# Patient Record
Sex: FEMALE | Race: WHITE
[De-identification: names, ages, dates, MRNs, and addresses within clinical notes are randomized per-mention and may not be internally consistent; named-entity substitution may affect disease eponyms.]

---

## 2017-08-12 ENCOUNTER — HEALTH MAINTENANCE LETTER (OUTPATIENT)
Age: 27
End: 2017-08-12

## 2017-10-05 ENCOUNTER — OFFICE VISIT (OUTPATIENT)
Dept: OBGYN | Facility: CLINIC | Age: 27
End: 2017-10-05
Payer: COMMERCIAL

## 2017-10-05 VITALS
HEART RATE: 60 BPM | SYSTOLIC BLOOD PRESSURE: 116 MMHG | DIASTOLIC BLOOD PRESSURE: 70 MMHG | WEIGHT: 138.1 LBS | HEIGHT: 65 IN | BODY MASS INDEX: 23.01 KG/M2

## 2017-10-05 DIAGNOSIS — R61 GENERALIZED HYPERHIDROSIS: ICD-10-CM

## 2017-10-05 DIAGNOSIS — Z30.41 ENCOUNTER FOR SURVEILLANCE OF CONTRACEPTIVE PILLS: ICD-10-CM

## 2017-10-05 DIAGNOSIS — Z01.419 ENCOUNTER FOR WELL WOMAN EXAM: Primary | ICD-10-CM

## 2017-10-05 DIAGNOSIS — Z12.4 SCREENING FOR MALIGNANT NEOPLASM OF CERVIX: ICD-10-CM

## 2017-10-05 DIAGNOSIS — Z11.3 ROUTINE SCREENING FOR STI (SEXUALLY TRANSMITTED INFECTION): ICD-10-CM

## 2017-10-05 PROCEDURE — G0145 SCR C/V CYTO,THINLAYER,RESCR: HCPCS | Performed by: OBSTETRICS & GYNECOLOGY

## 2017-10-05 PROCEDURE — 87389 HIV-1 AG W/HIV-1&-2 AB AG IA: CPT | Performed by: OBSTETRICS & GYNECOLOGY

## 2017-10-05 PROCEDURE — 87591 N.GONORRHOEAE DNA AMP PROB: CPT | Performed by: OBSTETRICS & GYNECOLOGY

## 2017-10-05 PROCEDURE — 99385 PREV VISIT NEW AGE 18-39: CPT | Performed by: OBSTETRICS & GYNECOLOGY

## 2017-10-05 PROCEDURE — 87340 HEPATITIS B SURFACE AG IA: CPT | Performed by: OBSTETRICS & GYNECOLOGY

## 2017-10-05 PROCEDURE — 36415 COLL VENOUS BLD VENIPUNCTURE: CPT | Performed by: OBSTETRICS & GYNECOLOGY

## 2017-10-05 PROCEDURE — 87491 CHLMYD TRACH DNA AMP PROBE: CPT | Performed by: OBSTETRICS & GYNECOLOGY

## 2017-10-05 RX ORDER — NORGESTIMATE AND ETHINYL ESTRADIOL 7DAYSX3 28
1 KIT ORAL DAILY
Qty: 84 TABLET | Refills: 3 | Status: SHIPPED | OUTPATIENT
Start: 2017-10-05 | End: 2018-09-06

## 2017-10-05 NOTE — MR AVS SNAPSHOT
"              After Visit Summary   10/5/2017    Daniela Singh    MRN: [id number]           Patient Information     Date Of Birth          1990        Visit Information        Provider Department      10/5/2017 2:30 PM Raysa Umanzor DO [facility]        Today's Diagnoses     Encounter for well woman exam    -  1    Encounter for surveillance of contraceptive pills        Routine screening for STI (sexually transmitted infection)        Screening for malignant neoplasm of cervix        Generalized hyperhidrosis           Follow-ups after your visit        Who to contact     If you have questions or need follow up information about today's clinic visit or your schedule please contact [facility] directly at [phone].  Normal or non-critical lab and imaging results will be communicated to you by MyChart, letter or phone within 4 business days after the clinic has received the results. If you do not hear from us within 7 days, please contact the clinic through MyChart or phone. If you have a critical or abnormal lab result, we will notify you by phone as soon as possible.  Submit refill requests through [employer] or call your pharmacy and they will forward the refill request to us. Please allow 3 business days for your refill to be completed.          Additional Information About Your Visit        MyChart Information     [employer] lets you send messages to your doctor, view your test results, renew your prescriptions, schedule appointments and more. To sign up, go to [email or web address] . Click on \"Log in\" on the left side of the screen, which will take you to the Welcome page. Then click on \"Sign up Now\" on the right side of the page.     You will be asked to enter the access code listed below, as well as some personal information. Please follow the directions to create your username and password.     Your access code is: [email or web address]  Expires: 12/23/2017  " "9:28 AM     Your access code will  in 90 days. If you need help or a new code, please call your [place] clinic or [phone].        Care EveryWhere ID     This is your Care EveryWhere ID. This could be used by other organizations to access your [place] medical records  [id number]        Your Vitals Were     Pulse Height Last Period BMI (Body Mass Index)          60 5' 5\" (1.651 m) 10/05/2017 (Exact Date) 22.98 kg/m2         Blood Pressure from Last 3 Encounters:   10/05/17 116/70   02/17/15 123/74   01/22/15 107/71    Weight from Last 3 Encounters:   10/05/17 138 lb 1.6 oz (62.6 kg)   02/17/15 131 lb 8 oz (59.6 kg)   01/22/15 124 lb (56.2 kg)              We Performed the Following     Chlamydia trachomatis PCR     Hepatitis B surface antigen     HIV Antigen Antibody Combo     Neisseria gonorrhoeae PCR     PAP imaged thin layer screen          Today's Medication Changes          These changes are accurate as of: 10/5/17  5:24 PM.  If you have any questions, ask your nurse or doctor.               Start taking these medicines.        Dose/Directions    aluminum chloride 20 % external solution   Commonly known as:  DRYSOL   Used for:  Encounter for surveillance of contraceptive pills   Started by:  Raysa Umanzor DO        Apply topically At Bedtime   Quantity:  35 mL   Refills:  1         These medicines have changed or have updated prescriptions.        Dose/Directions    norgestim-eth estrad triphasic 0.18/0.215/0.25 MG-35 MCG per tablet   Commonly known as:  ORTHO TRI-CYCLEN (28)   This may have changed:  when to take this   Used for:  Encounter for surveillance of contraceptive pills   Changed by:  Raysa Umanzor DO        Dose:  1 tablet   Take 1 tablet by mouth daily   Quantity:  84 tablet   Refills:  3            Where to get your medicines      These medications were sent to [facility] - [place], [place] - [address] AT [facility] [place]  [address] " [place], [facility] [address]     Phone:  [phone]     aluminum chloride 20 % external solution    norgestim-eth estrad triphasic 0.18/0.215/0.25 MG-35 MCG per tablet                Primary Care Provider Office Phone # Fax #    Charli Kay [phone]       [address] [facility] [address]        Equal Access to Services     JORDAN MARTINEZ : Hadii aad ku hadasho Soomaali, waaxda luqadaha, qaybta kaalmada adeegyada, waxay idiin hayaan adeeg kharash la'lalan ah. So [facility] [phone].    ATENCIÓN: Si tona espkeshia, tiene a schofield disposición servicios gratuitos de asistencia lingüística. [facility] [phone].    We comply with applicable federal civil rights laws and [place] laws. We do not discriminate on the basis of race, color, national origin, age, disability, sex, sexual orientation, or gender identity.            Thank you!     Thank you for choosing [facility]  for your care. Our goal is always to provide you with excellent care. Hearing back from our patients is one way we can continue to improve our services. Please take a few minutes to complete the written survey that you may receive in the mail after your visit with us. Thank you!             Your Updated Medication List - Protect others around you: Learn how to safely use, store and throw away your medicines at www.disposemymeds.org.          This list is accurate as of: 10/5/17  5:24 PM.  Always use your most recent med list.                   Brand Name Dispense Instructions for use Diagnosis    aluminum chloride 20 % external solution    DRYSOL    35 mL    Apply topically At Bedtime    Encounter for surveillance of contraceptive pills       norgestim-eth estrad triphasic 0.18/0.215/0.25 MG-35 MCG per tablet    ORTHO TRI-CYCLEN (28)    84 tablet    Take 1 tablet by mouth daily    Encounter for surveillance of contraceptive pills       VITAMIN C PO      Take by mouth daily

## 2017-10-05 NOTE — PROGRESS NOTES
OBGYN Annual Exam     SUBJECTIVE:                                                      Daniela is a 27 year old  female who presents for annual exam.     Patient's last menstrual period was 10/05/2017 (exact date).  Menses are regular q 28-30 days,  Mild cramping and acne.  Using oral contraceptives for contraception. Trinessa refilled. She is not currently considering pregnancy.  Besides routine health maintenance,  she would like to discuss heavy generalized sweating. States she sweats all the time and has been unable to control it with antiperspirants. She has tried a product placed posterior to her axilla (does not know the name).  GYNECOLOGIC HISTORY:    Daniela is sexually active with male partners, multiple partners.    History sexually transmitted infections:No STD history  STI testing offered?  Accepted - she would like full testing performed.  History of abnormal Pap smear: yes, states she had an abnormal pap one year ago and was told to have a repeat pap this year.     Mammogram not appropriate for this patient based on age.    Family history of breast CA: No  Family history of uterine/ovarian CA: No    Family history of colon CA: No      HISTORY:  Obstetric History       T0      L0     SAB0   TAB0   Ectopic0   Multiple0   Live Births0         Past Medical History:   Diagnosis Date     Diagnostic skin and sensitization tests (aka ALLERGENS) 1/22/15 IgE tests (pt with dermatographism) all NEGATIVE for environmental allergens    history of dermatographism.     Nonallergic rhinitis     1/22/15 IgE tests (pt with dermatographism) all NEGATIVE for environmental allergens     Past Surgical History:   Procedure Laterality Date     C LEG/ANKLE SURGERY PROC UNLISTED  09    tore tendons in left ankle     Family History   Problem Relation Age of Onset     Family History Negative No family hx of      Glaucoma No family hx of      Macular Degeneration No family hx of      Social History  "    Social History     Marital status: Single     Spouse name: N/A     Number of children: N/A     Years of education: N/A     Social History Main Topics     Smoking status: Never Smoker     Smokeless tobacco: Never Used     Alcohol use No      Comment: rare     Drug use: No     Sexual activity: Yes     Partners: Male     Birth control/ protection: Condom, Pill      Comment: desmond forte 1/26/09     Other Topics Concern     None     Social History Narrative       Current Outpatient Prescriptions:      Ascorbic Acid (VITAMIN C PO), Take by mouth daily, Disp: , Rfl:      norgestim-eth estrad triphasic (ORTHO TRI-CYCLEN, 28,) 0.18/0.215/0.25 MG-35 MCG TABS, Take 1 tablet by mouth., Disp: , Rfl:      Allergies   Allergen Reactions     Nka [No Known Allergies]        Past medical, surgical, social and family history were reviewed and updated in EPIC.    ROS:   12 point review of systems negative other than symptoms noted below.      OBJECTIVE:                                                      EXAM:  /70 (BP Location: Left arm, Patient Position: Chair, Cuff Size: Adult Regular)  Pulse 60  Ht 5' 5\" (1.651 m)  Wt 138 lb 1.6 oz (62.6 kg)  LMP 10/05/2017 (Exact Date)  BMI 22.98 kg/m2   BMI: Body mass index is 22.98 kg/(m^2).  General: Alert and oriented, no distress.  Skin: Warm and dry, no lesions, rashes or discolorations.  Neck: Neck supple. Thyroid palpbably normal in size and without nodularity.  Cardiovascular: Regular rate and rhythm, no murmurs, rubs or gallops.  Axillas: significant perspiration bilaterally  Lungs:  Clear to auscultation bilaterally, breathing is unlabored.  Breasts:  Symmetric, no skin changes.  No dominant masses bilaterally.   Lymph:  No cervical, supraclavicular, infraclavicular, axillary or inguinal lymphadenopathy palpable.   Abdomen: Soft, nontender, no hepatosplenomegaly, no rebound or guarding, no masses, no hernias.   Vulva:  No external lesions, normal female hair " "distribution, no inguinal adenopathy.    Urethra:  Midline, non-tender, well supported, no discharge  Vagina:  Well-estrogenized, no abnormal discharge, no lesions  Cervix: no lesions, no discharge, cervix very posterior and difficult to reach even with gutiérrez swab manipulation - pap smear obtained with visualization of majority of cervix  Uterus:  anteverted, smooth contour, without enlargement, mobile, and without tenderness  Ovaries:  No masses appreciated, non-tender, mobile  Rectal Exam: deferred  Musculoskeletal: extremities normal      COUNSELING:        Regular exercise       Healthy diet/nutrition       Contraception       Family planning       Osteoporosis Prevention/Bone Health       Safe sex practices/STD prevention     reports that she has never smoked. She has never used smokeless tobacco.      Estimated body mass index is 22.98 kg/(m^2) as calculated from the following:    Height as of this encounter: 5' 5\" (1.651 m).    Weight as of this encounter: 138 lb 1.6 oz (62.6 kg).         ASSESSMENT/PLAN:                                                      27 year old female with satisfactory annual exam    ICD-10-CM    1. Routine screening for STI (sexually transmitted infection) Z11.3 Hepatitis B surface antigen     Chlamydia trachomatis PCR     Neisseria gonorrhoeae PCR     HIV Antigen Antibody Combo     CANCELED: Trichomonas vaginalis DNA PCR   2. Encounter for surveillance of contraceptive pills Z30.41 norgestim-eth estrad triphasic (ORTHO TRI-CYCLEN, 28,) 0.18/0.215/0.25 MG-35 MCG per tablet        3. Screening for malignant neoplasm of cervix Z12.4 PAP imaged thin layer screen   4. Generalized hyperhidrosis R61 aluminum chloride (DRYSOL) 20 % external solution      Hyperhidrosis: Drysol (Aluminum chloride hexahydrate) 20% prescribed, recommended hydrocortisone 2.5% for irritation. Apply when skin dry. Patient to see PCP if no improvement.    Patient feels as though as has vaginal \"tears or cuts\"  -- no " current symptoms or evidence of this today (additionally, no abuse or association with clothing or products). Patient will make an appointment if this recurs.   Raysa Umanzor, DO

## 2017-10-05 NOTE — LETTER
October 20, 2017      Daniela Singh  [address] [facility]  [address]  [place] [facility] [address]    Dear MsConnerSamantha,      I am happy to inform you that your recent cervical cancer screening test (PAP smear) was normal.      Preventative screenings such as this help to ensure your health for years to come. You should repeat a pap smear in 3 years, unless otherwise directed.      You will still need to return to the clinic every year for your annual exam and other preventive tests.     Please contact the clinic at [phone] if you have further questions.       Sincerely,      Raysa Umanzor DO/h

## 2017-10-05 NOTE — LETTER
[facility]  [address], [address]  [place] [address]  [phone]        October 9, 2017    Daniela Singh  [address] [facility]  [address]  [place] [address]            Dear MsConner Singh:      The results of your recent GC Chlamydia, HIV screening and Hepatitis B labs  were NORMAL.   Results for orders placed or performed in visit on 10/05/17   Hepatitis B surface antigen   Result Value Ref Range    Hep B Surface Agn Nonreactive NR^Nonreactive   HIV Antigen Antibody Combo   Result Value Ref Range    HIV Antigen Antibody Combo Nonreactive NR^Nonreactive       Chlamydia trachomatis PCR   Result Value Ref Range    Specimen Description Cervix     Chlamydia Trachomatis PCR Negative NEG^Negative   Neisseria gonorrhoeae PCR   Result Value Ref Range    Specimen Descrip Cervix     N Gonorrhea PCR Negative NEG^Negative      If you have any further questions or problems, please contact our office.    Sincerely,      Raysa Umanzor, DO

## 2017-10-05 NOTE — NURSING NOTE
"Chief Complaint   Patient presents with     Physical     due for pap     Refill Request     OCP- Trinessa- no concerns, patient would like 3 month supply     Perspiration     would like to discuss treatment options       Initial /70 (BP Location: Left arm, Patient Position: Chair, Cuff Size: Adult Regular)  Pulse 60  Ht 5' 5\" (1.651 m)  Wt 138 lb 1.6 oz (62.6 kg)  LMP 10/05/2017 (Exact Date)  BMI 22.98 kg/m2 Estimated body mass index is 22.98 kg/(m^2) as calculated from the following:    Height as of this encounter: 5' 5\" (1.651 m).    Weight as of this encounter: 138 lb 1.6 oz (62.6 kg).  Medication Reconciliation: complete     Jolynn Box CMA      "

## 2017-10-06 LAB
C TRACH DNA SPEC QL NAA+PROBE: NEGATIVE
HBV SURFACE AG SERPL QL IA: NONREACTIVE
HIV 1+2 AB+HIV1 P24 AG SERPL QL IA: NONREACTIVE
N GONORRHOEA DNA SPEC QL NAA+PROBE: NEGATIVE
SPECIMEN SOURCE: NORMAL
SPECIMEN SOURCE: NORMAL

## 2017-10-09 LAB
COPATH REPORT: NORMAL
PAP: NORMAL

## 2017-10-18 ENCOUNTER — TELEPHONE (OUTPATIENT)
Dept: OBGYN | Facility: CLINIC | Age: 27
End: 2017-10-18

## 2017-10-18 NOTE — TELEPHONE ENCOUNTER
Screening pap from 10/5/17 = Normal cytology, 27 yr old    No prior pap results in Epic chart.  Per visit notes:  History of abnormal Pap smear: yes, states she had an abnormal pap one year ago and was told to have a repeat pap this year.     Dr. Umanzor- Please advise for follow up due to abnormal pap one year ago with no available result.  When would you like repeat pap?    Thank you  Lynette Nissen RN

## 2017-10-19 NOTE — TELEPHONE ENCOUNTER
Noted.  Pap result letter sent to patient with recommendation for 3 year screening.  Lynette Nissen RN

## 2017-10-19 NOTE — TELEPHONE ENCOUNTER
Addy Beltrán,  Will a negative pap at age 27, lets repeat pap in 3 years - so in 2020.    Thank you,  Dr. Umanzor

## 2018-09-06 DIAGNOSIS — Z30.41 ENCOUNTER FOR SURVEILLANCE OF CONTRACEPTIVE PILLS: ICD-10-CM

## 2018-09-06 RX ORDER — NORGESTIMATE AND ETHINYL ESTRADIOL 7DAYSX3 28
1 KIT ORAL DAILY
Qty: 84 TABLET | Refills: 0 | Status: SHIPPED | OUTPATIENT
Start: 2018-09-06 | End: 2018-12-03

## 2018-10-10 ENCOUNTER — OFFICE VISIT (OUTPATIENT)
Dept: OBGYN | Facility: CLINIC | Age: 28
End: 2018-10-10
Payer: COMMERCIAL

## 2018-10-10 VITALS
WEIGHT: 137 LBS | DIASTOLIC BLOOD PRESSURE: 60 MMHG | SYSTOLIC BLOOD PRESSURE: 104 MMHG | HEIGHT: 65 IN | BODY MASS INDEX: 22.82 KG/M2

## 2018-10-10 DIAGNOSIS — Z30.09 ENCOUNTER FOR OTHER GENERAL COUNSELING OR ADVICE ON CONTRACEPTION: ICD-10-CM

## 2018-10-10 DIAGNOSIS — Z00.00 WELLNESS EXAMINATION: Primary | ICD-10-CM

## 2018-10-10 PROCEDURE — 99395 PREV VISIT EST AGE 18-39: CPT | Performed by: OBSTETRICS & GYNECOLOGY

## 2018-10-10 PROCEDURE — 87591 N.GONORRHOEAE DNA AMP PROB: CPT | Performed by: OBSTETRICS & GYNECOLOGY

## 2018-10-10 PROCEDURE — 87491 CHLMYD TRACH DNA AMP PROBE: CPT | Performed by: OBSTETRICS & GYNECOLOGY

## 2018-10-10 NOTE — PROGRESS NOTES
Daniela is a 28 year old  female who presents for annual exam.     Besides routine health maintenance,  she would like to discuss desire for IUD.    HPI:    GYNECOLOGIC HISTORY:    Patient's last menstrual period was 10/04/2018 (exact date).  Her current contraception method is: oral contraceptives - Trinessa.  She  reports that she has never smoked. She has never used smokeless tobacco.    Patient is sexually active.  STD testing offered? Accepted  Last PHQ-9 score on record =   PHQ-9 SCORE 2015   Total Score 2     Last GAD7 score on record =   LINDSEY-7 SCORE 2015   Total Score 1     Alcohol Score = 0    HEALTH MAINTENANCE:  Cholesterol: Never  Last Mammo: Never  Pap:  Neg  Lab Results   Component Value Date    PAP NIL 10/05/2017    PAP NIL 2009   Colonoscopy: Never  Dexa:  Never    Health maintenance updated:  yes    HISTORY:  Obstetric History       T0      L0     SAB0   TAB0   Ectopic0   Multiple0   Live Births0           Patient Active Problem List   Diagnosis     Ankle pain     Other postprocedural status(V45.89)     CARDIOVASCULAR SCREENING; LDL GOAL LESS THAN 160     Nonallergic rhinitis     Diagnostic skin and sensitization tests (aka ALLERGENS)     Past Surgical History:   Procedure Laterality Date     C LEG/ANKLE SURGERY PROC UNLISTED  09    tore tendons in left ankle      Social History   Substance Use Topics     Smoking status: Never Smoker     Smokeless tobacco: Never Used     Alcohol use 0.0 oz/week     0 Standard drinks or equivalent per week      Comment: rare         Negative family history of: Family History Negative, Glaucoma, Macular Degeneration            Current Outpatient Prescriptions   Medication Sig     Ascorbic Acid (VITAMIN C PO) Take by mouth daily     norgestim-eth estrad triphasic (ORTHO TRI-CYCLEN, 28,) 0.18/0.215/0.25 MG-35 MCG per tablet Take 1 tablet by mouth daily     No current facility-administered medications for this visit.   "    Allergies   Allergen Reactions     Nka [No Known Allergies]        Past medical, surgical, social and family histories were reviewed and updated in EPIC.    ROS:   12 point review of systems negative other than symptoms noted below.    EXAM:  /60 (BP Location: Right arm, Patient Position: Chair, Cuff Size: Adult Regular)  Ht 5' 5\" (1.651 m)  Wt 137 lb (62.1 kg)  LMP 10/04/2018 (Exact Date)  BMI 22.8 kg/m2   BMI: Body mass index is 22.8 kg/(m^2).    PHYSICAL EXAM:  Constitutional:  Appearance: Well nourished, well developed, alert, in no acute distress  Neck:  Lymph Nodes:  No lymphadenopathy present    Thyroid:  Gland size normal, nontender, no nodules or masses present  on palpation  Chest:  Respiratory Effort:  Breathing unlabored  Cardiovascular:    Heart: Auscultation:  Regular rate, normal rhythm, no murmurs present  Breasts: Inspection of Breasts:  No lymphadenopathy present., Palpation of Breasts and Axillae:  No masses present on palpation, no breast tenderness., Axillary Lymph Nodes:  No lymphadenopathy present. and No nodularity, asymmetry or nipple discharge bilaterally.  Gastrointestinal:   Abdominal Examination:  Abdomen nontender to palpation, tone normal without rigidity or guarding, no masses present, umbilicus without lesions   Liver and Spleen:  No hepatomegaly present, liver nontender to palpation    Hernias:  No hernias present  Lymphatic: Lymph Nodes:  No other lymphadenopathy present  Skin:  General Inspection:  No rashes present, no lesions present, no areas of  discoloration    Genitalia and Groin:  No rashes present, no lesions present, no areas of  discoloration, no masses present  Neurologic/Psychiatric:    Mental Status:  Oriented X3     Pelvic Exam:  External Genitalia:     Normal appearance for age, no discharge present, no tenderness present, no inflammatory lesions present, color normal  Vagina:     Normal vaginal vault without central or paravaginal defects, no " discharge present, no inflammatory lesions present, no masses present  Bladder:     Nontender to palpation  Urethra:   Urethral Body:  Urethra palpation normal, urethra structural support normal   Urethral Meatus:  No erythema or lesions present  Cervix:     Appearance healthy, no lesions present, nontender to palpation, no bleeding present  Uterus:     Uterus: firm, normal sized and nontender  Adnexa:     No adnexal tenderness present, no adnexal masses present  Perineum:     Perineum within normal limits, no evidence of trauma, no rashes or skin lesions present  Anus:     Anus within normal limits, no hemorrhoids present  Inguinal Lymph Nodes:     No lymphadenopathy present  Pubic Hair:     Normal pubic hair distribution for age  Genitalia and Groin:     No rashes present, no lesions present, no areas of discoloration, no masses present      COUNSELING:   Reviewed preventive health counseling, as reflected in patient instructions       Contraception       Family planning       Safe sex practices/STD prevention    BMI: Body mass index is 22.8 kg/(m^2).      ASSESSMENT:  28 year old female with satisfactory annual exam.    ICD-10-CM    1. Wellness examination Z00.00 NEISSERIA GONORRHOEA PCR     CHLAMYDIA TRACHOMATIS PCR   2. Encounter for other general counseling or advice on contraception Z30.09        PLAN:  Discussed all forms of Long Acting Reversible Contraceptive (LARC). She desires Mirena IUD. She has 4 friends that have it and like their IUD. Risks and benefits for placement.   Discussed timing in her cycle to schedule.       Raysa Umanzor, DO  OB/GYN

## 2018-10-10 NOTE — NURSING NOTE
"Chief Complaint   Patient presents with     Physical       Initial /60 (BP Location: Right arm, Patient Position: Chair, Cuff Size: Adult Regular)  Ht 5' 5\" (1.651 m)  Wt 137 lb (62.1 kg)  LMP 10/04/2018 (Exact Date)  BMI 22.8 kg/m2 Estimated body mass index is 22.8 kg/(m^2) as calculated from the following:    Height as of this encounter: 5' 5\" (1.651 m).    Weight as of this encounter: 137 lb (62.1 kg).  BP completed using cuff size: regular        The following HM Due: NONE        Gosia Porter CMA    "

## 2018-10-10 NOTE — MR AVS SNAPSHOT
After Visit Summary   10/10/2018    Daniela Singh    MRN: [id number]           Patient Information     Date Of Birth          1990        Visit Information        Provider Department      10/10/2018 3:00 PM Raysa Umanzor DO [facility]        Today's [facility]     Wellness examination    -  1    Encounter for other general counseling or advice on contraception          Care Instructions    Preventive Health Recommendations  Female Ages 26 - 39  Yearly exam:    See your health care provider every year in order to    Review health changes.      Discuss preventive care.       Review your medicines if you your doctor has prescribed any.    Until age 30: Get a Pap test every three years (more often if you have had an abnormal result).    After age 30: Talk to your doctor about whether you should have a Pap test every 3 years or have a Pap test with HPV screening every 5 years.    You do not need a Pap test if your uterus was removed (hysterectomy) and you have not had cancer.  You should be tested each year for STDs (sexually transmitted diseases), if you're at risk.   Talk to your provider about how often to have your cholesterol checked.  If you are at risk for diabetes, you should have a diabetes test (fasting glucose).  Shots: Get a flu shot each year. Get a tetanus shot every 10 years.    Nutrition:      Eat at least 5 servings of fruits and vegetables each day.    Eat whole-grain bread, whole-wheat pasta and brown rice instead of white grains and rice.    Consume 1000mg of Calcium and 400 IU of Vitamin D daily. If these are not in your regular diet you should take a supplement.    To calculate the calcium in your food add a zero to the percent found on the packaging (ex: 30% = 300mg of calcium per serving)    Lifestyle    Get in at least 150 minutes of physical activity a week (30 minutes a day, 5 days of the week), of which about half should be vigorous exercise  "(jogging, swimming, lifting weights).  This will help you control your weight and prevent disease.    Limit alcohol to one drink per day.    No smoking.      Wear sunscreen to prevent skin cancer.    See your dentist every six months for an exam and cleaning          Follow-ups after your visit        Follow-up notes from your care team     Return in about 2 weeks (around 10/24/2018).      Who to contact     If you have questions or need follow up information about today's clinic visit or your schedule please contact [facility] directly at [phone].  Normal or non-critical lab and imaging results will be communicated to you by [employer], letter or phone within 4 business days after the clinic has received the results. If you do not hear from us within 7 days, please contact the clinic through [employer] or phone. If you have a critical or abnormal lab result, we will notify you by phone as soon as possible.  Submit refill requests through [employer] or call your pharmacy and they will forward the refill request to us. Please allow 3 business days for your refill to be completed.          Additional Information About Your Visit        MyChart Information     [employer] lets you send messages to your doctor, view your test results, renew your prescriptions, schedule appointments and more. To sign up, go to [email or web address] . Click on \"Log in\" on the left side of the screen, which will take you to the Welcome page. Then click on \"Sign up Now\" on the right side of the page.     You will be asked to enter the access code listed below, as well as some personal information. Please follow the directions to create your username and password.     Your access code is: [email or web address]  Expires: 2019  3:45 PM     Your access code will  in 90 days. If you need help or a new code, please call your [facility] or [phone].        Care EveryWhere ID     This is your Care EveryWhere ID. This " "could be used by other organizations to access your [facility] medical records  [id number]        Your Vitals Were     Height Last Period BMI (Body Mass Index)             5' 5\" (1.651 m) 10/04/2018 (Exact Date) 22.8 kg/m2          Blood Pressure from Last 3 Encounters:   10/10/18 104/60   10/05/17 116/70   02/17/15 123/74    Weight from Last 3 Encounters:   10/10/18 137 lb (62.1 kg)   10/05/17 138 lb 1.6 oz (62.6 kg)   02/17/15 131 lb 8 oz (59.6 kg)              We Performed the Following     CHLAMYDIA TRACHOMATIS PCR     NEISSERIA GONORRHOEA PCR          Today's Medication Changes          These changes are accurate as of 10/10/18  3:45 PM.  If you have any questions, ask your nurse or doctor.               Stop taking these medicines if you haven't already. Please contact your care team if you have questions.     aluminum chloride 20 % external solution   Commonly known as:  DRYSOL   Stopped by:  Raysa Umanzor,                     Primary Care Provider Office Phone # Fax #    Charli Kay [phone]       [address] [facility] [address]        Equal Access to Services     SUMA MARTINEZ AH: Hadii delfina cardenas hadbereo Soroxieali, waaxda luqadaha, qaybta kaalmada adekatieyada, sarina castellanos. So [facility] [phone].    ATENCIÓN: Si habla español, tiene a schofield disposición servicios gratuitos de asistencia lingüística. Llame al [phone].    We comply with applicable federal civil rights laws and [place] laws. We do not discriminate on the basis of race, color, national origin, age, disability, sex, sexual orientation, or gender identity.            Thank you!     Thank you for choosing [facility]  for your care. Our goal is always to provide you with excellent care. Hearing back from our patients is one way we can continue to improve our services. Please take a few minutes to complete the written survey that you may receive in the mail after " your visit with us. Thank you!             Your Updated Medication List - Protect others around you: Learn how to safely use, store and throw away your medicines at www.disposemymeds.org.          This list is accurate as of 10/10/18  3:45 PM.  Always use your most recent med list.                   Brand Name Dispense Instructions for use Diagnosis    norgestim-eth estrad triphasic 0.18/0.215/0.25 MG-35 MCG per tablet    ORTHO TRI-CYCLEN (28)    84 tablet    Take 1 tablet by mouth daily    Encounter for surveillance of contraceptive pills       VITAMIN C PO      Take by mouth daily

## 2018-10-11 DIAGNOSIS — Z30.09 ENCOUNTER FOR OTHER GENERAL COUNSELING OR ADVICE ON CONTRACEPTION: Primary | ICD-10-CM

## 2018-10-11 RX ORDER — MISOPROSTOL 100 UG/1
400 TABLET ORAL ONCE
Qty: 4 TABLET | Refills: 0 | Status: SHIPPED | OUTPATIENT
Start: 2018-10-11 | End: 2018-10-11

## 2018-10-12 LAB
C TRACH DNA SPEC QL NAA+PROBE: NEGATIVE
N GONORRHOEA DNA SPEC QL NAA+PROBE: NEGATIVE
SPECIMEN SOURCE: NORMAL
SPECIMEN SOURCE: NORMAL

## 2018-11-05 ENCOUNTER — OFFICE VISIT (OUTPATIENT)
Dept: OBGYN | Facility: CLINIC | Age: 28
End: 2018-11-05
Payer: COMMERCIAL

## 2018-11-05 VITALS
HEIGHT: 65 IN | BODY MASS INDEX: 23.82 KG/M2 | WEIGHT: 143 LBS | SYSTOLIC BLOOD PRESSURE: 92 MMHG | DIASTOLIC BLOOD PRESSURE: 60 MMHG

## 2018-11-05 DIAGNOSIS — Z01.812 PRE-PROCEDURE LAB EXAM: Primary | ICD-10-CM

## 2018-11-05 DIAGNOSIS — Z30.430 ENCOUNTER FOR IUD INSERTION: ICD-10-CM

## 2018-11-05 DIAGNOSIS — Z30.430 ENCOUNTER FOR INSERTION OF MIRENA IUD: ICD-10-CM

## 2018-11-05 LAB — HCG, QUAL URINE: NORMAL

## 2018-11-05 PROCEDURE — 58300 INSERT INTRAUTERINE DEVICE: CPT | Performed by: OBSTETRICS & GYNECOLOGY

## 2018-11-05 PROCEDURE — 84703 CHORIONIC GONADOTROPIN ASSAY: CPT | Performed by: OBSTETRICS & GYNECOLOGY

## 2018-11-05 NOTE — MR AVS SNAPSHOT
After Visit Summary   11/5/2018    Daniela Singh    MRN: [id number]           Patient Information     Date Of Birth          1990        Visit Information        Provider Department      11/5/2018 2:15 PM Raysa Umanzor DO [facility]        Today's Diagnoses     Pre-procedure lab exam    -  1    Encounter for IUD insertion        Encounter for insertion of mirena IUD           Follow-ups after your visit        Follow-up notes from your care team     Return in about 4 weeks (around 12/3/2018) for String Check.      Your next 10 appointments already scheduled     Dec 03, 2018  2:45 PM CST   SHORT with Raysa Umanzor DO   [facility] ([facility])    [address]  [address]  [facility] [address] [phone]              Who to contact     If you have questions or need follow up information about today's clinic visit or your schedule please contact [facility] directly at [phone].  Normal or non-critical lab and imaging results will be communicated to you by [employer]hart, letter or phone within 4 business days after the clinic has received the results. If you do not hear from us within 7 days, please contact the clinic through [employer] or phone. If you have a critical or abnormal lab result, we will notify you by phone as soon as possible.  Submit refill requests through [employer]ceive or call your pharmacy and they will forward the refill request to us. Please allow 3 business days for your refill to be completed.          Additional Information About Your Visit        MyChart Information     Xceive gives you secure access to your electronic health record. If you see a primary care provider, you can also send messages to your care team and make appointments. If you have questions, please call your primary care clinic.  If you do not have a primary care provider, please call [phone] and they will assist you.       "  Care EveryWhere ID     This is your Care EveryWhere ID. This could be used by other organizations to access your [place] medical records  [id number]        Your Vitals Were     Height Last Period BMI (Body Mass Index)             5' 5\" (1.651 m) 11/01/2018 (Exact Date) 23.8 kg/m2          Blood Pressure from Last 3 Encounters:   11/05/18 92/60   10/10/18 104/60   10/05/17 116/70    Weight from Last 3 Encounters:   11/05/18 143 lb (64.9 kg)   10/10/18 137 lb (62.1 kg)   10/05/17 138 lb 1.6 oz (62.6 kg)              We Performed the Following     HC LEVONORGESTREL IU 52MG 5 YR     HCL HCG, URINE, NURSE BACKOFFICE     INSERTION INTRAUTERINE DEVICE          Today's Medication Changes          These changes are accurate as of 11/5/18  4:51 PM.  If you have any questions, ask your nurse or doctor.               Start taking these medicines.        Dose/Directions    levonorgestrel 20 MCG/24HR IUD   Commonly known as:  MIRENA (52 MG)   Used for:  Encounter for IUD insertion   Started by:  Raysa Umanzor,         Dose:  1 each   1 each (20 mcg) by Intrauterine route once for 1 dose   Quantity:  1 each   Refills:  0            Where to get your medicines      Some of these will need a paper prescription and others can be bought over the counter.  Ask your nurse if you have questions.     You don't need a prescription for these medications     levonorgestrel 20 MCG/24HR IUD                Primary Care Provider Office Phone # Fax #    Charli Kay [phone]       [facility] [address] [facility] [id number]        Equal Access to Services     [facility] AH: Hadii delfina Portillo, watracyda lustephon, qaybta kaalsarina foreman. So [facility] [phone].    ATENCIÓN: Si habla español, tiene a schofield disposición servicios gratuitos de asistencia lingüística. Llame al [phone].    We comply with applicable federal civil rights laws and [place] laws. We do " not discriminate on the basis of race, color, national origin, age, disability, sex, sexual orientation, or gender identity.            Thank you!     Thank you for choosing [facility]  for your care. Our goal is always to provide you with excellent care. Hearing back from our patients is one way we can continue to improve our services. Please take a few minutes to complete the written survey that you may receive in the mail after your visit with us. Thank you!             Your Updated Medication List - Protect others around you: Learn how to safely use, store and throw away your medicines at www.disposemymeds.org.          This list is accurate as of 11/5/18  4:51 PM.  Always use your most recent med list.                   Brand Name Dispense Instructions for use Diagnosis    levonorgestrel 20 MCG/24HR IUD    MIRENA (52 MG)    1 each    1 each (20 mcg) by Intrauterine route once for 1 dose    Encounter for IUD insertion       norgestim-eth estrad triphasic 0.18/0.215/0.25 MG-35 MCG per tablet    ORTHO TRI-CYCLEN (28)    84 tablet    Take 1 tablet by mouth daily    Encounter for surveillance of contraceptive pills       VITAMIN C PO      Take by mouth daily

## 2018-11-05 NOTE — NURSING NOTE
"Chief Complaint   Patient presents with     IUD     Mirena Insertion       Initial BP 92/60 (BP Location: Left arm, Patient Position: Chair, Cuff Size: Adult Regular)  Ht 5' 5\" (1.651 m)  Wt 143 lb (64.9 kg)  LMP 2018 (Exact Date)  BMI 23.8 kg/m2 Estimated body mass index is 23.8 kg/(m^2) as calculated from the following:    Height as of this encounter: 5' 5\" (1.651 m).    Weight as of this encounter: 143 lb (64.9 kg).  BP completed using cuff size: regular    Questioned patient about current smoking habits.  Pt. has never smoked.          The following HM Due: NONE      Gosia Porter CMA         "

## 2018-11-05 NOTE — PROGRESS NOTES
INDICATIONS:                                                      Is a pregnancy test required: Yes.  Was it positive or negative?  Negative  Was a consent obtained?  Yes    Mirena  Lot # [id number]  Exp:     Daniela Singh is a 28 year old female,   who presents for insertion of an IUD. The risks, benefits and alternatives of IUD insertion were discussed in detail previously. She also has reviewed the product brochure.  She has elected to go ahead with the insertion  today and her questions were answered. Consent was signed. Her LMP began on 18 and was normal in duration and amount of flow. A pregnancy test was performed today:  Yes. The patient took Cytotec prior to the procedure as prescribed.    Today's PHQ-2 Score:   PHQ-2 (  [employer]) 10/5/2017   Q1: Little interest or pleasure in doing things 0   Q2: Feeling down, depressed or hopeless 0   PHQ-2 Score 0       PROCEDURE:                                                      The pelvic exam revealed normal external genitalia. On bimanual exam the uterus was Anteverted and normal in size with no tenderness present. A speculum was inserted into the vagina and the cervix was visualized. The cervix was prepped with Betadine .The uterus sounded to 6 cm. A Mirena IUD was then inserted without difficulty. The string was cut to 3 cm.  The patient experienced a mild amount of cramping.    POST PROCEDURE:                                                      She  tolerated the procedure well. There were no complications. Patient was discharged in stable condition.    Return to clinic in 5 weeks for IUD check.  Call if severe cramping, fever, abnormal bleeding, abnormal discharge or pelvic pain develop.  Patient was counseled about the chance of irregular bleeding.    Raysa Umanzor DO

## 2018-12-03 ENCOUNTER — OFFICE VISIT (OUTPATIENT)
Dept: OBGYN | Facility: CLINIC | Age: 28
End: 2018-12-03
Payer: COMMERCIAL

## 2018-12-03 VITALS
SYSTOLIC BLOOD PRESSURE: 112 MMHG | BODY MASS INDEX: 23.66 KG/M2 | HEIGHT: 65 IN | DIASTOLIC BLOOD PRESSURE: 70 MMHG | WEIGHT: 142 LBS

## 2018-12-03 DIAGNOSIS — Z97.5 IUD (INTRAUTERINE DEVICE) IN PLACE: Primary | ICD-10-CM

## 2018-12-03 PROCEDURE — 99213 OFFICE O/P EST LOW 20 MIN: CPT | Performed by: OBSTETRICS & GYNECOLOGY

## 2018-12-03 NOTE — MR AVS SNAPSHOT
"              After Visit Summary   12/3/2018    Daniela Singh    MRN: [id number]           Patient Information     Date Of Birth          1990        Visit Information        Provider Department      12/3/2018 2:45 PM Raysa Umanzor DO [facility]        Today's Diagnoses     IUD (intrauterine device) in place    -  1       Follow-ups after your visit        Who to contact     If you have questions or need follow up information about today's clinic visit or your schedule please contact [facility] directly at [phone].  Normal or non-critical lab and imaging results will be communicated to you by [employer]hart, letter or phone within 4 business days after the clinic has received the results. If you do not hear from us within 7 days, please contact the clinic through [employer] or phone. If you have a critical or abnormal lab result, we will notify you by phone as soon as possible.  Submit refill requests through [email or web address] or call your pharmacy and they will forward the refill request to us. Please allow 3 business days for your refill to be completed.          Additional Information About Your Visit        MyChart Information     [email or web address] gives you secure access to your electronic health record. If you see a primary care provider, you can also send messages to your care team and make appointments. If you have questions, please call your primary care clinic.  If you do not have a primary care provider, please call [phone] and they will assist you.        Care EveryWhere ID     This is your Care EveryWhere ID. This could be used by other organizations to access your [place] medical records  [id number]        Your Vitals Were     Height BMI (Body Mass Index)                5' 5\" (1.651 m) 23.63 kg/m2           Blood Pressure from Last 3 Encounters:   12/03/18 112/70   11/05/18 92/60   10/10/18 104/60    Weight from Last 3 Encounters:   12/03/18 142 lb (64.4 kg)   11/05/18 143 " lb (64.9 kg)   10/10/18 137 lb (62.1 kg)              Today, you had the following     No orders found for display         Today's Medication Changes          These changes are accurate as of 12/3/18  3:25 PM.  If you have any questions, ask your nurse or doctor.               Stop taking these medicines if you haven't already. Please contact your care team if you have questions.     norgestim-eth estrad triphasic 0.18/0.215/0.25 MG-35 MCG tablet   Commonly known as:  ORTHO TRI-CYCLEN (28)   Stopped by:  Raysa Umanzor,                     Primary Care Provider Office Phone # Fax #    Charli Kay [phone]       [address] [place] [address]        Equal Access to Services     SUMA MARTINEZ : Hadii delfina cardenas hadasho Soomaali, waaxda luqadaha, qaybta kaalmada adeegyada, sarnia rae hayamanda ferguson . So [facility] [phone].    ATENCIÓN: Si habla español, tiene a schofield disposición servicios gratuitos de asistencia lingüística. Llame al [phone].    We comply with applicable federal civil rights laws and [place] laws. We do not discriminate on the basis of race, color, national origin, age, disability, sex, sexual orientation, or gender identity.            Thank you!     Thank you for choosing [facility]  for your care. Our goal is always to provide you with excellent care. Hearing back from our patients is one way we can continue to improve our services. Please take a few minutes to complete the written survey that you may receive in the mail after your visit with us. Thank you!             Your Updated Medication List - Protect others around you: Learn how to safely use, store and throw away your medicines at www.disposemymeds.org.          This list is accurate as of 12/3/18  3:25 PM.  Always use your most recent med list.                   Brand Name Dispense Instructions for use Diagnosis    levonorgestrel 20 MCG/24HR IUD    MIRENA (52 MG)    1  each    1 each (20 mcg) by Intrauterine route once for 1 dose    Encounter for IUD insertion       VITAMIN C PO      Take by mouth daily

## 2018-12-03 NOTE — NURSING NOTE
"Chief Complaint   Patient presents with     IUD     placed 10/11/18       Initial /70 (BP Location: Right arm, Patient Position: Chair, Cuff Size: Adult Regular)  Ht 5' 5\" (1.651 m)  Wt 142 lb (64.4 kg)  BMI 23.63 kg/m2 Estimated body mass index is 23.63 kg/(m^2) as calculated from the following:    Height as of this encounter: 5' 5\" (1.651 m).    Weight as of this encounter: 142 lb (64.4 kg).  BP completed using cuff size: regular    Questioned patient about current smoking habits.  Pt. has never smoked.          The following HM Due: NONE    Gosia Porter CMA    "

## 2018-12-03 NOTE — PROGRESS NOTES
"  SUBJECTIVE:                                                   Daniela Singh is a 28 year old female who presents to clinic today for the following health issue(s):  Patient presents with:  IUD: placed 10/11/18      HPI:  Here for follow up of a Mirena IUD. She reports spotting for 2 weeks which has since resolved.  Had nausea on way home from placement. This resolved upon arriving home. No further cramping. Denies fever or chills.   Was due for her period on Friday.      Problem list and histories reviewed & adjusted, as indicated.  Additional history: as documented.    Patient Active Problem List   Diagnosis     Ankle pain     Other postprocedural status(V45.89)     CARDIOVASCULAR SCREENING; LDL GOAL LESS THAN 160     Nonallergic rhinitis     Diagnostic skin and sensitization tests (aka ALLERGENS)     Encounter for insertion of mirena IUD     Past Surgical History:   Procedure Laterality Date     C LEG/ANKLE SURGERY PROC UNLISTED  1/1/09    tore tendons in left ankle      Social History   Substance Use Topics     Smoking status: Never Smoker     Smokeless tobacco: Never Used     Alcohol use 0.0 oz/week     0 Standard drinks or equivalent per week      Comment: rare         Negative family history of: Family History Negative, Glaucoma, Macular Degeneration              Current Outpatient Prescriptions on File Prior to Visit:  Ascorbic Acid (VITAMIN C PO) Take by mouth daily   levonorgestrel (MIRENA, 52 MG,) 20 MCG/24HR IUD 1 each (20 mcg) by Intrauterine route once for 1 dose     No current facility-administered medications on file prior to visit.   Allergies   Allergen Reactions     Nka [No Known Allergies]        ROS:  5 point ROS negative except as noted in HPI.    OBJECTIVE:     /70 (BP Location: Right arm, Patient Position: Chair, Cuff Size: Adult Regular)  Ht 5' 5\" (1.651 m)  Wt 142 lb (64.4 kg)  BMI 23.63 kg/m2  Body mass index is 23.63 kg/(m^2).    Exam:  General: Alert and oriented, no " distress.  Vulva:  No external lesions, normal female hair distribution, no inguinal adenopathy.    Urethra:  Midline, non-tender, well supported, no discharge  Vagina:  Well-estrogenized, no abnormal discharge, no lesions  Cervix: no lesions, no discharge  Uterus:  anteverted, smooth contour, without enlargement, mobile, and without tenderness,   Mirena IUD strings are visualized at 3 cm and no other portion of the IUD is seen.    In-Clinic Test Results:  No results found for this or any previous visit (from the past 24 hour(s)).    ASSESSMENT/PLAN:                                                        ICD-10-CM    1. IUD (intrauterine device) in place Z97.5      Period late likely due to IUD in place. Will take home pregnancy test in 1.5 weeks.   Follow up as needed for routine gyn care.    Raysa Umanzor [facility]

## 2019-11-06 ENCOUNTER — HEALTH MAINTENANCE LETTER (OUTPATIENT)
Age: 29
End: 2019-11-06

## 2020-02-16 ENCOUNTER — HEALTH MAINTENANCE LETTER (OUTPATIENT)
Age: 30
End: 2020-02-16

## 2020-11-29 ENCOUNTER — HEALTH MAINTENANCE LETTER (OUTPATIENT)
Age: 30
End: 2020-11-29

## 2021-01-15 ENCOUNTER — HEALTH MAINTENANCE LETTER (OUTPATIENT)
Age: 31
End: 2021-01-15

## 2021-04-10 ENCOUNTER — HEALTH MAINTENANCE LETTER (OUTPATIENT)
Age: 31
End: 2021-04-10

## 2021-09-19 ENCOUNTER — HEALTH MAINTENANCE LETTER (OUTPATIENT)
Age: 31
End: 2021-09-19

## 2022-05-01 ENCOUNTER — HEALTH MAINTENANCE LETTER (OUTPATIENT)
Age: 32
End: 2022-05-01

## 2022-11-21 ENCOUNTER — HEALTH MAINTENANCE LETTER (OUTPATIENT)
Age: 32
End: 2022-11-21

## 2023-06-02 ENCOUNTER — HEALTH MAINTENANCE LETTER (OUTPATIENT)
Age: 33
End: 2023-06-02